# Patient Record
Sex: FEMALE | Race: OTHER | NOT HISPANIC OR LATINO
[De-identification: names, ages, dates, MRNs, and addresses within clinical notes are randomized per-mention and may not be internally consistent; named-entity substitution may affect disease eponyms.]

---

## 2018-03-19 PROBLEM — Z00.00 ENCOUNTER FOR PREVENTIVE HEALTH EXAMINATION: Status: ACTIVE | Noted: 2018-03-19

## 2018-04-10 ENCOUNTER — FORM ENCOUNTER (OUTPATIENT)
Age: 33
End: 2018-04-10

## 2018-04-14 ENCOUNTER — ASOB RESULT (OUTPATIENT)
Age: 33
End: 2018-04-14

## 2018-04-14 ENCOUNTER — APPOINTMENT (OUTPATIENT)
Dept: ANTEPARTUM | Facility: CLINIC | Age: 33
End: 2018-04-14
Payer: COMMERCIAL

## 2018-04-14 ENCOUNTER — APPOINTMENT (OUTPATIENT)
Dept: ANTEPARTUM | Facility: CLINIC | Age: 33
End: 2018-04-14

## 2018-04-14 PROCEDURE — 36416 COLLJ CAPILLARY BLOOD SPEC: CPT

## 2018-04-14 PROCEDURE — 76813 OB US NUCHAL MEAS 1 GEST: CPT

## 2018-04-19 LAB
1ST TRIMESTER DATA: NORMAL
ADDENDUM DOC: NORMAL
AFP PNL SERPL: NORMAL
AFP SERPL-ACNC: NORMAL
CLINICAL BIOCHEMIST REVIEW: NORMAL
FREE BETA HCG 1ST TRIMESTER: NORMAL
Lab: NORMAL
NASAL BONE: PRESENT
NOTES NTD: NORMAL
NT: NORMAL
PAPP-A SERPL-ACNC: NORMAL
TRISOMY 18/3: NORMAL

## 2018-06-05 ENCOUNTER — APPOINTMENT (OUTPATIENT)
Dept: ANTEPARTUM | Facility: CLINIC | Age: 33
End: 2018-06-05
Payer: COMMERCIAL

## 2018-06-05 ENCOUNTER — ASOB RESULT (OUTPATIENT)
Age: 33
End: 2018-06-05

## 2018-06-05 PROCEDURE — 76811 OB US DETAILED SNGL FETUS: CPT

## 2018-06-12 LAB
1ST TRIMESTER DATA: NORMAL
2ND TRIMESTER DATA: NORMAL
AFP PNL SERPL: NORMAL
AFP SERPL-ACNC: NORMAL
AFP SERPL-ACNC: NORMAL
B-HCG FREE SERPL-MCNC: NORMAL
CLINICAL BIOCHEMIST REVIEW: NORMAL
FREE BETA HCG 1ST TRIMESTER: NORMAL
INHIBIN A SERPL-MCNC: NORMAL
NASAL BONE: PRESENT
NOTES NTD: NORMAL
NT: NORMAL
PAPP-A SERPL-ACNC: NORMAL
U ESTRIOL SERPL-SCNC: NORMAL

## 2018-07-03 ENCOUNTER — ASOB RESULT (OUTPATIENT)
Age: 33
End: 2018-07-03

## 2018-07-03 ENCOUNTER — APPOINTMENT (OUTPATIENT)
Dept: ANTEPARTUM | Facility: CLINIC | Age: 33
End: 2018-07-03
Payer: COMMERCIAL

## 2018-07-03 PROCEDURE — 76817 TRANSVAGINAL US OBSTETRIC: CPT

## 2018-07-03 PROCEDURE — 76816 OB US FOLLOW-UP PER FETUS: CPT

## 2018-08-01 ENCOUNTER — APPOINTMENT (OUTPATIENT)
Dept: ANTEPARTUM | Facility: CLINIC | Age: 33
End: 2018-08-01
Payer: COMMERCIAL

## 2018-08-01 ENCOUNTER — ASOB RESULT (OUTPATIENT)
Age: 33
End: 2018-08-01

## 2018-08-01 PROCEDURE — 76816 OB US FOLLOW-UP PER FETUS: CPT

## 2018-10-10 ENCOUNTER — ASOB RESULT (OUTPATIENT)
Age: 33
End: 2018-10-10

## 2018-10-10 ENCOUNTER — APPOINTMENT (OUTPATIENT)
Dept: ANTEPARTUM | Facility: CLINIC | Age: 33
End: 2018-10-10
Payer: COMMERCIAL

## 2018-10-10 PROCEDURE — 76819 FETAL BIOPHYS PROFIL W/O NST: CPT

## 2018-10-10 PROCEDURE — 76816 OB US FOLLOW-UP PER FETUS: CPT

## 2020-11-24 ENCOUNTER — FORM ENCOUNTER (OUTPATIENT)
Age: 35
End: 2020-11-24

## 2021-01-03 ENCOUNTER — FORM ENCOUNTER (OUTPATIENT)
Age: 36
End: 2021-01-03

## 2021-01-06 ENCOUNTER — FORM ENCOUNTER (OUTPATIENT)
Age: 36
End: 2021-01-06

## 2021-06-21 ENCOUNTER — FORM ENCOUNTER (OUTPATIENT)
Age: 36
End: 2021-06-21

## 2021-06-28 ENCOUNTER — FORM ENCOUNTER (OUTPATIENT)
Age: 36
End: 2021-06-28

## 2021-07-14 ENCOUNTER — OUTPATIENT (OUTPATIENT)
Dept: OUTPATIENT SERVICES | Facility: HOSPITAL | Age: 36
LOS: 1 days | End: 2021-07-14
Payer: COMMERCIAL

## 2021-07-14 DIAGNOSIS — Z20.828 CONTACT WITH AND (SUSPECTED) EXPOSURE TO OTHER VIRAL COMMUNICABLE DISEASES: ICD-10-CM

## 2021-07-14 LAB — SARS-COV-2 RNA SPEC QL NAA+PROBE: SIGNIFICANT CHANGE UP

## 2021-07-14 PROCEDURE — 87635 SARS-COV-2 COVID-19 AMP PRB: CPT

## 2021-07-14 RX ORDER — SODIUM CHLORIDE 9 MG/ML
1000 INJECTION, SOLUTION INTRAVENOUS
Refills: 0 | Status: DISCONTINUED | OUTPATIENT
Start: 2021-07-15 | End: 2021-07-15

## 2021-07-14 RX ORDER — OXYTOCIN 10 UNIT/ML
333.33 VIAL (ML) INJECTION
Qty: 20 | Refills: 0 | Status: COMPLETED | OUTPATIENT
Start: 2021-07-15 | End: 2021-07-15

## 2021-07-14 NOTE — OB PROVIDER H&P - ATTENDING COMMENTS
Patient admitted for term induction of labor, AMA.  Patient was counseled regarding induction by her provider prior to admission.  FHRT reassuring  GBS positive   favorable cervix- plan for pitocin

## 2021-07-14 NOTE — OB PROVIDER H&P - ASSESSMENT
Patient is a 36 year old  at 39.5 weeks gestation admitted to L&D for elective IOL. Cat ___ FHT.    - consent  - admission labs  - IV hydration  - continuous toco and fetal heart monitoring  - GBS negative   - induction method:  - pain management:    Discussed with     Patient is a 36 year old  at 39.5 weeks gestation admitted to L&D for elective IOL. NST reactive.    - consent  - admission labs  - IV hydration  - continuous toco and fetal heart monitoring  - GBS negative     Discussed with Dr. Ortiz   Patient is a 36 year old  at 39.5 weeks gestation admitted to L&D for elective IOL. NST reactive.    - consent  - Childs score favorable for IOL  - IOL method: pitocin   - admission labs  - IV hydration  - continuous toco and fetal heart monitoring  - GBS negative     Discussed with Dr. Garcia

## 2021-07-14 NOTE — OB PROVIDER H&P - NSHPPHYSICALEXAM_GEN_ALL_CORE
Vital Signs Last 24 Hrs  T(C): 36.7 (15 Jul 2021 05:39), Max: 36.7 (15 Jul 2021 05:34)  T(F): 98.06 (15 Jul 2021 05:39), Max: 98.1 (15 Jul 2021 05:34)  HR: 88 (15 Jul 2021 05:39) (88 - 88)  BP: 118/57 (15 Jul 2021 05:39) (118/57 - 118/57)  RR: 16 (15 Jul 2021 05:39) (16 - 16)  General: Alert and oriented x3, no acute distress  Cardiovascular: regular rate and rhythm, no murmurs, rubs or gallops appreciated on exam  Respiratory: clear to auscultation bilaterally  Abdominal: gravid uterus, non-tender to palpation  Extremities: no redness, tenderness or swelling in lower extremities bilaterally     FHT: baseline 135bpm, moderate variability, +accels, -deccels  [place]: irregular contractions q5 minutes  Ultrasound: cephalic presentation, anterior placenta Vital Signs Last 24 Hrs  T(C): 36.7 (15 Jul 2021 05:39), Max: 36.7 (15 Jul 2021 05:34)  T(F): 98.06 (15 Jul 2021 05:39), Max: 98.1 (15 Jul 2021 05:34)  HR: 88 (15 Jul 2021 05:39) (88 - 88)  BP: 118/57 (15 Jul 2021 05:39) (118/57 - 118/57)  RR: 16 (15 Jul 2021 05:39) (16 - 16)  General: Alert and oriented x3, no acute distress  Cardiovascular: regular rate and rhythm, no murmurs, rubs or gallops appreciated on exam  Respiratory: clear to auscultation bilaterally  Abdominal: gravid uterus, non-tender to palpation  Extremities: no redness, tenderness or swelling in lower extremities bilaterally   SVE: 3/50/-2    FHT: baseline 135bpm, moderate variability, +accels, -deccels  [place]: irregular contractions q5 minutes  Ultrasound: cephalic presentation, anterior placenta

## 2021-07-15 ENCOUNTER — INPATIENT (INPATIENT)
Facility: HOSPITAL | Age: 36
LOS: 0 days | Discharge: HOME | End: 2021-07-16
Attending: OBSTETRICS & GYNECOLOGY | Admitting: OBSTETRICS & GYNECOLOGY
Payer: COMMERCIAL

## 2021-07-15 ENCOUNTER — TRANSCRIPTION ENCOUNTER (OUTPATIENT)
Age: 36
End: 2021-07-15

## 2021-07-15 VITALS
TEMPERATURE: 98 F | RESPIRATION RATE: 16 BRPM | DIASTOLIC BLOOD PRESSURE: 57 MMHG | WEIGHT: 199.96 LBS | HEIGHT: 63 IN | HEART RATE: 88 BPM | SYSTOLIC BLOOD PRESSURE: 118 MMHG

## 2021-07-15 LAB
ABO RH CONFIRMATION: SIGNIFICANT CHANGE UP
BASOPHILS # BLD AUTO: 0.03 K/UL — SIGNIFICANT CHANGE UP (ref 0–0.2)
BASOPHILS NFR BLD AUTO: 0.3 % — SIGNIFICANT CHANGE UP (ref 0–2)
BLD GP AB SCN SERPL QL: SIGNIFICANT CHANGE UP
COVID-19 SPIKE DOMAIN AB INTERP: NEGATIVE — SIGNIFICANT CHANGE UP
COVID-19 SPIKE DOMAIN ANTIBODY RESULT: 0.4 U/ML — SIGNIFICANT CHANGE UP
EOSINOPHIL # BLD AUTO: 0.05 K/UL — SIGNIFICANT CHANGE UP (ref 0–0.5)
EOSINOPHIL NFR BLD AUTO: 0.5 % — SIGNIFICANT CHANGE UP (ref 0–6)
HCT VFR BLD CALC: 36.1 % — SIGNIFICANT CHANGE UP (ref 34.5–45)
HGB BLD-MCNC: 12.4 G/DL — SIGNIFICANT CHANGE UP (ref 11.5–15.5)
HIV 1 & 2 AB SERPL IA.RAPID: SIGNIFICANT CHANGE UP
HIV 1+2 AB+HIV1 P24 AG SERPL QL IA: SIGNIFICANT CHANGE UP
IMM GRANULOCYTES NFR BLD AUTO: 0.6 % — SIGNIFICANT CHANGE UP (ref 0–1.5)
LYMPHOCYTES # BLD AUTO: 1.78 K/UL — SIGNIFICANT CHANGE UP (ref 1–3.3)
LYMPHOCYTES # BLD AUTO: 16.5 % — SIGNIFICANT CHANGE UP (ref 13–44)
MCHC RBC-ENTMCNC: 30.5 PG — SIGNIFICANT CHANGE UP (ref 27–34)
MCHC RBC-ENTMCNC: 34.3 GM/DL — SIGNIFICANT CHANGE UP (ref 32–36)
MCV RBC AUTO: 88.7 FL — SIGNIFICANT CHANGE UP (ref 80–100)
MEV IGG SER-ACNC: 38.2 AU/ML — SIGNIFICANT CHANGE UP
MEV IGG+IGM SER-IMP: POSITIVE — SIGNIFICANT CHANGE UP
MONOCYTES # BLD AUTO: 0.72 K/UL — SIGNIFICANT CHANGE UP (ref 0–0.9)
MONOCYTES NFR BLD AUTO: 6.7 % — SIGNIFICANT CHANGE UP (ref 2–14)
NEUTROPHILS # BLD AUTO: 8.18 K/UL — HIGH (ref 1.8–7.4)
NEUTROPHILS NFR BLD AUTO: 75.4 % — SIGNIFICANT CHANGE UP (ref 43–77)
PLATELET # BLD AUTO: 182 K/UL — SIGNIFICANT CHANGE UP (ref 150–400)
RBC # BLD: 4.07 M/UL — SIGNIFICANT CHANGE UP (ref 3.8–5.2)
RBC # FLD: 16.6 % — HIGH (ref 10.3–14.5)
SARS-COV-2 IGG+IGM SERPL QL IA: 0.4 U/ML — SIGNIFICANT CHANGE UP
SARS-COV-2 IGG+IGM SERPL QL IA: NEGATIVE — SIGNIFICANT CHANGE UP
T PALLIDUM AB TITR SER: NEGATIVE — SIGNIFICANT CHANGE UP
WBC # BLD: 10.82 K/UL — HIGH (ref 3.8–10.5)
WBC # FLD AUTO: 10.82 K/UL — HIGH (ref 3.8–10.5)

## 2021-07-15 RX ORDER — DIBUCAINE 1 %
1 OINTMENT (GRAM) RECTAL EVERY 6 HOURS
Refills: 0 | Status: DISCONTINUED | OUTPATIENT
Start: 2021-07-15 | End: 2021-07-16

## 2021-07-15 RX ORDER — LANOLIN
1 OINTMENT (GRAM) TOPICAL EVERY 6 HOURS
Refills: 0 | Status: DISCONTINUED | OUTPATIENT
Start: 2021-07-15 | End: 2021-07-16

## 2021-07-15 RX ORDER — DIPHENHYDRAMINE HCL 50 MG
25 CAPSULE ORAL EVERY 6 HOURS
Refills: 0 | Status: DISCONTINUED | OUTPATIENT
Start: 2021-07-15 | End: 2021-07-16

## 2021-07-15 RX ORDER — OXYCODONE HYDROCHLORIDE 5 MG/1
5 TABLET ORAL
Refills: 0 | Status: DISCONTINUED | OUTPATIENT
Start: 2021-07-15 | End: 2021-07-16

## 2021-07-15 RX ORDER — PRAMOXINE HYDROCHLORIDE 150 MG/15G
1 AEROSOL, FOAM RECTAL EVERY 4 HOURS
Refills: 0 | Status: DISCONTINUED | OUTPATIENT
Start: 2021-07-15 | End: 2021-07-16

## 2021-07-15 RX ORDER — SIMETHICONE 80 MG/1
80 TABLET, CHEWABLE ORAL EVERY 4 HOURS
Refills: 0 | Status: DISCONTINUED | OUTPATIENT
Start: 2021-07-15 | End: 2021-07-16

## 2021-07-15 RX ORDER — OXYTOCIN 10 UNIT/ML
333.33 VIAL (ML) INJECTION
Qty: 20 | Refills: 0 | Status: DISCONTINUED | OUTPATIENT
Start: 2021-07-15 | End: 2021-07-16

## 2021-07-15 RX ORDER — IBUPROFEN 200 MG
600 TABLET ORAL EVERY 6 HOURS
Refills: 0 | Status: COMPLETED | OUTPATIENT
Start: 2021-07-15 | End: 2022-06-13

## 2021-07-15 RX ORDER — CITRIC ACID/SODIUM CITRATE 300-500 MG
30 SOLUTION, ORAL ORAL ONCE
Refills: 0 | Status: COMPLETED | OUTPATIENT
Start: 2021-07-15 | End: 2021-07-15

## 2021-07-15 RX ORDER — BENZOCAINE 10 %
1 GEL (GRAM) MUCOUS MEMBRANE EVERY 6 HOURS
Refills: 0 | Status: DISCONTINUED | OUTPATIENT
Start: 2021-07-15 | End: 2021-07-16

## 2021-07-15 RX ORDER — MAGNESIUM HYDROXIDE 400 MG/1
30 TABLET, CHEWABLE ORAL
Refills: 0 | Status: DISCONTINUED | OUTPATIENT
Start: 2021-07-15 | End: 2021-07-16

## 2021-07-15 RX ORDER — HYDROCORTISONE 1 %
1 OINTMENT (GRAM) TOPICAL EVERY 6 HOURS
Refills: 0 | Status: DISCONTINUED | OUTPATIENT
Start: 2021-07-15 | End: 2021-07-16

## 2021-07-15 RX ORDER — IBUPROFEN 200 MG
600 TABLET ORAL EVERY 6 HOURS
Refills: 0 | Status: DISCONTINUED | OUTPATIENT
Start: 2021-07-15 | End: 2021-07-16

## 2021-07-15 RX ORDER — OXYTOCIN 10 UNIT/ML
VIAL (ML) INJECTION
Qty: 30 | Refills: 0 | Status: DISCONTINUED | OUTPATIENT
Start: 2021-07-15 | End: 2021-07-15

## 2021-07-15 RX ORDER — SODIUM CHLORIDE 9 MG/ML
3 INJECTION INTRAMUSCULAR; INTRAVENOUS; SUBCUTANEOUS EVERY 8 HOURS
Refills: 0 | Status: DISCONTINUED | OUTPATIENT
Start: 2021-07-15 | End: 2021-07-16

## 2021-07-15 RX ORDER — OXYCODONE HYDROCHLORIDE 5 MG/1
5 TABLET ORAL ONCE
Refills: 0 | Status: DISCONTINUED | OUTPATIENT
Start: 2021-07-15 | End: 2021-07-16

## 2021-07-15 RX ORDER — TETANUS TOXOID, REDUCED DIPHTHERIA TOXOID AND ACELLULAR PERTUSSIS VACCINE, ADSORBED 5; 2.5; 8; 8; 2.5 [IU]/.5ML; [IU]/.5ML; UG/.5ML; UG/.5ML; UG/.5ML
0.5 SUSPENSION INTRAMUSCULAR ONCE
Refills: 0 | Status: DISCONTINUED | OUTPATIENT
Start: 2021-07-15 | End: 2021-07-16

## 2021-07-15 RX ORDER — KETOROLAC TROMETHAMINE 30 MG/ML
30 SYRINGE (ML) INJECTION ONCE
Refills: 0 | Status: DISCONTINUED | OUTPATIENT
Start: 2021-07-15 | End: 2021-07-15

## 2021-07-15 RX ORDER — AER TRAVELER 0.5 G/1
1 SOLUTION RECTAL; TOPICAL EVERY 4 HOURS
Refills: 0 | Status: DISCONTINUED | OUTPATIENT
Start: 2021-07-15 | End: 2021-07-16

## 2021-07-15 RX ORDER — SODIUM CHLORIDE 9 MG/ML
1000 INJECTION, SOLUTION INTRAVENOUS ONCE
Refills: 0 | Status: COMPLETED | OUTPATIENT
Start: 2021-07-15 | End: 2021-07-15

## 2021-07-15 RX ORDER — ACETAMINOPHEN 500 MG
975 TABLET ORAL
Refills: 0 | Status: DISCONTINUED | OUTPATIENT
Start: 2021-07-15 | End: 2021-07-16

## 2021-07-15 RX ADMIN — Medication 975 MILLIGRAM(S): at 21:41

## 2021-07-15 RX ADMIN — Medication 1000 MILLIUNIT(S)/MIN: at 14:29

## 2021-07-15 RX ADMIN — Medication 30 MILLIGRAM(S): at 16:08

## 2021-07-15 RX ADMIN — SODIUM CHLORIDE 1000 MILLILITER(S): 9 INJECTION, SOLUTION INTRAVENOUS at 09:45

## 2021-07-15 RX ADMIN — Medication 30 MILLIGRAM(S): at 16:23

## 2021-07-15 RX ADMIN — SODIUM CHLORIDE 125 MILLILITER(S): 9 INJECTION, SOLUTION INTRAVENOUS at 05:54

## 2021-07-15 RX ADMIN — SODIUM CHLORIDE 3 MILLILITER(S): 9 INJECTION INTRAMUSCULAR; INTRAVENOUS; SUBCUTANEOUS at 21:41

## 2021-07-15 RX ADMIN — Medication 2 MILLIUNIT(S)/MIN: at 08:20

## 2021-07-15 RX ADMIN — Medication 30 MILLILITER(S): at 10:10

## 2021-07-15 RX ADMIN — Medication 975 MILLIGRAM(S): at 20:56

## 2021-07-15 NOTE — OB RN PATIENT PROFILE - AS SC BRADEN MOBILITY
Declan Mendez), Internal Medicine  [address] [place] [address]  Phone: [phone]  Fax: [phone]    Laurie Fuentes  [address] [place] [address]  Phone: [phone]  Fax: (   )    -    Abdifatah Brown  [facility]  Oncology building  Level C  Phone: [phone]  Fax: [phone] (4) no limitation

## 2021-07-15 NOTE — DISCHARGE NOTE OB - CARE PROVIDER_API CALL
Matt Garcia)  Obstetrics and Gynecology  [address], [address]  [place] [address]  Phone: [phone]  Fax: [phone]  Follow Up Time: 1 month

## 2021-07-15 NOTE — OB PROVIDER DELIVERY SUMMARY - NSPROVIDERDELIVERYNOTE_OBGYN_ALL_OB_FT
at 1423 of a live female w/ Apgars 9/9. Weighing deferred for skin-to-skin. Delivered NASIMA, no nuchal cord, clear fluid. Midline episiotomy incised. Infant's head delivered with maternal expulsive efforts. Shoulders delivered without difficulty followed by the rest of the body. Nose and mouth were bulb suctioned. Cord clamped and cut after delay. Samples obtained. Baby handed to patient. Placenta delivered spontaneously, intact, 3VC. Fundus firm, minimal bleeding. Perineum and vagina inspected – midline episiotomy repaired. EBL 250cc. Hemostasis noted. Pt tolerated procedure well, in stable condition, recovering in LDR. Infant in LDR. Instrument/sponge count correct x 2 and confirmed by nurse.

## 2021-07-15 NOTE — DISCHARGE NOTE OB - NSCORESITESY/N_GEN_A_CORE_RD
Calling to find out her current plan of care. Mireya Nurse  at [facility] [phone]. Thank you. Aracelis    Returned call.   Left message
No

## 2021-07-15 NOTE — DISCHARGE NOTE OB - PATIENT PORTAL LINK FT
You can access the FollowMyHealth Patient Portal offered by [facility] by registering at the following website: [email or web address]. By joining [facility]’s FollowMyHealth portal, you will also be able to view your health information using other applications (apps) compatible with our system.

## 2021-07-15 NOTE — DISCHARGE NOTE OB - CARE PLAN
Principal Discharge DX:	 (normal spontaneous vaginal delivery)  Goal:	rapid recovery  Assessment and plan of treatment:	1) Please take Ibuprofen as needed for pain control  2) Nothing in the vagina for 6 weeks (including no sex, no tampons, and no douching).  3) Please call your doctor for a follow up your postpartum appointment in 4-6 weeks.  4) Please continue taking vitamins postpartum. Take iron and colace for acute blood loss anemia.  5) Please call the office sooner if you have heavy vaginal bleeding, severe abdominal pain, or fever > 100.4F.  6) You may resume regular activity as tolerated

## 2021-07-15 NOTE — DISCHARGE NOTE OB - PLAN OF CARE
rapid recovery 1) Please take Ibuprofen as needed for pain control  2) Nothing in the vagina for 6 weeks (including no sex, no tampons, and no douching).  3) Please call your doctor for a follow up your postpartum appointment in 4-6 weeks.  4) Please continue taking vitamins postpartum. Take iron and colace for acute blood loss anemia.  5) Please call the office sooner if you have heavy vaginal bleeding, severe abdominal pain, or fever > 100.4F.  6) You may resume regular activity as tolerated

## 2021-07-15 NOTE — OB RN DELIVERY SUMMARY - NSSELHIDDEN_OBGYN_ALL_OB_FT
[NS_DeliveryAttending1_OBGYN_ALL_OB_FT:[id number]],[NS_DeliveryRN_OBGYN_ALL_OB_FT:[id number]=]

## 2021-07-15 NOTE — DISCHARGE NOTE OB - MEDICATION SUMMARY - MEDICATIONS TO TAKE
I will START or STAY ON the medications listed below when I get home from the hospital:    acetaminophen 500 mg oral tablet  -- 2 tab(s) by mouth every 6 hours MDD:4 tablets  -- This product contains acetaminophen.  Do not use  with any other product containing acetaminophen to prevent possible liver damage.    -- Indication: For pain    ibuprofen 600 mg oral tablet  -- 1 tab(s) by mouth every 6 hours MDD:4 tablets  -- Do not take this drug if you are pregnant.  It is very important that you take or use this exactly as directed.  Do not skip doses or discontinue unless directed by your doctor.  May cause drowsiness or dizziness.  Obtain medical advice before taking any non-prescription drugs as some may affect the action of this medication.  Take with food or milk.    -- Indication: For pain    Prenatal Multivitamins with Folic Acid 1 mg oral tablet  -- 1 tab(s) by mouth once a day  -- Indication: For maternal wellness

## 2021-07-15 NOTE — OB RN DELIVERY SUMMARY - NS_SEPSISRSKCALC_OBGYN_ALL_OB_FT
EOS calculated successfully. EOS Risk Factor: 0.5/1000 live births ([facility] national incidence); GA=39w5d; Temp=98.1; ROM=3.55; GBS='Negative'; Antibiotics='No antibiotics or any antibiotics < 2 hrs prior to birth'

## 2021-07-15 NOTE — OB PROVIDER DELIVERY SUMMARY - NSSELHIDDEN_OBGYN_ALL_OB_FT
[NS_DeliveryAttending1_OBGYN_ALL_OB_FT:[id number]],[NS_DeliveryRN_OBGYN_ALL_OB_FT:[id number]],[NS_DeliveryAssist1_OBGYN_ALL_OB_FT:[id number]=]

## 2021-07-16 VITALS — SYSTOLIC BLOOD PRESSURE: 112 MMHG | HEART RATE: 85 BPM | DIASTOLIC BLOOD PRESSURE: 73 MMHG | RESPIRATION RATE: 18 BRPM

## 2021-07-16 LAB
HCT VFR BLD CALC: 34.7 % — SIGNIFICANT CHANGE UP (ref 34.5–45)
HGB BLD-MCNC: 11.3 G/DL — LOW (ref 11.5–15.5)

## 2021-07-16 PROCEDURE — G0463: CPT

## 2021-07-16 PROCEDURE — 86765 RUBEOLA ANTIBODY: CPT

## 2021-07-16 PROCEDURE — 86769 SARS-COV-2 COVID-19 ANTIBODY: CPT

## 2021-07-16 PROCEDURE — 86703 HIV-1/HIV-2 1 RESULT ANTBDY: CPT

## 2021-07-16 PROCEDURE — 85018 HEMOGLOBIN: CPT

## 2021-07-16 PROCEDURE — 85014 HEMATOCRIT: CPT

## 2021-07-16 PROCEDURE — 59025 FETAL NON-STRESS TEST: CPT

## 2021-07-16 PROCEDURE — 86780 TREPONEMA PALLIDUM: CPT

## 2021-07-16 PROCEDURE — 87389 HIV-1 AG W/HIV-1&-2 AB AG IA: CPT

## 2021-07-16 PROCEDURE — 86901 BLOOD TYPING SEROLOGIC RH(D): CPT

## 2021-07-16 PROCEDURE — 85025 COMPLETE CBC W/AUTO DIFF WBC: CPT

## 2021-07-16 PROCEDURE — 86850 RBC ANTIBODY SCREEN: CPT

## 2021-07-16 PROCEDURE — 86900 BLOOD TYPING SEROLOGIC ABO: CPT

## 2021-07-16 PROCEDURE — 36415 COLL VENOUS BLD VENIPUNCTURE: CPT

## 2021-07-16 PROCEDURE — 59050 FETAL MONITOR W/REPORT: CPT

## 2021-07-16 RX ORDER — ACETAMINOPHEN 500 MG
2 TABLET ORAL
Qty: 32 | Refills: 0
Start: 2021-07-16 | End: 2021-07-19

## 2021-07-16 RX ORDER — IBUPROFEN 200 MG
1 TABLET ORAL
Qty: 16 | Refills: 0
Start: 2021-07-16 | End: 2021-07-19

## 2021-07-16 RX ADMIN — Medication 975 MILLIGRAM(S): at 15:35

## 2021-07-16 RX ADMIN — Medication 600 MILLIGRAM(S): at 05:21

## 2021-07-16 RX ADMIN — Medication 975 MILLIGRAM(S): at 03:11

## 2021-07-16 RX ADMIN — Medication 600 MILLIGRAM(S): at 00:31

## 2021-07-16 RX ADMIN — Medication 975 MILLIGRAM(S): at 16:35

## 2021-07-16 RX ADMIN — Medication 600 MILLIGRAM(S): at 06:07

## 2021-07-16 RX ADMIN — Medication 600 MILLIGRAM(S): at 13:27

## 2021-07-16 RX ADMIN — Medication 600 MILLIGRAM(S): at 01:16

## 2021-07-16 RX ADMIN — Medication 975 MILLIGRAM(S): at 09:35

## 2021-07-16 RX ADMIN — Medication 975 MILLIGRAM(S): at 10:35

## 2021-07-16 RX ADMIN — Medication 1 TABLET(S): at 12:28

## 2021-07-16 RX ADMIN — Medication 600 MILLIGRAM(S): at 12:27

## 2021-07-16 RX ADMIN — Medication 975 MILLIGRAM(S): at 02:26

## 2021-07-16 NOTE — PROGRESS NOTE ADULT - SUBJECTIVE AND OBJECTIVE BOX
CRESENCIO BOGGS is a 36y  now PPD#1 s/p spontaneous vaginal delivery at 39.5 weeks gestation, uncomplicated.    S:    No acute events overnight.   The patient has no complaints.  Pain controlled with current treatment regimen.   She is ambulating without difficulty and tolerating PO.   + flatus/-BM/+ voiding   She endorses appropriate lochia, which is decreasing.   She is breastfeeding without difficulty. She denies feeling engorged.   She denies fevers, chills, nausea and vomiting.   She denies lightheadedness, dizziness, palpitations, chest pain and SOB.   She is on contact precautions because  tested positive for COVID. She tested negative and is asymptomatic.     O:    T(C): 36.4 (21 @ 05:31), Max: 36.9 (07-15-21 @ 17:00)  HR: 98 (21 @ 05:31) (77 - 106)  BP: 102/67 (21 @ 05:31) (78/56 - 120/71)  RR: 18 (21 @ 05:31) (16 - 18)  SpO2: 98% (21 @ 05:31) (97% - 100%)    Gen: NAD, AOx3  CV: RRR, S1/S2 present  Pulm: CTAB  Abdomen:  Soft, non-tender, non-distended, +bowel sounds  Uterus:  Fundus firm below umbilicus  VE:  Expectant lochia  Ext:  Bilateral lower extremities non-tender and non-edematous                          12.4   10.82 )-----------( 182      ( 15 Jul 2021 06:23 )             36.1

## 2021-07-16 NOTE — PROGRESS NOTE ADULT - ASSESSMENT
CRESENCIO BOGGS is a 36y  now PPD#1 s/p spontaneous vaginal delivery at 39.5 weeks gestation, uncomplicated.      A/P:    -Vital signs stable  -Hgb: 12.4 -> AM labs pending   -Voiding, tolerating PO, bowel function nml   -Advance care as tolerated   -Continue routine postpartum care and education  -Healthy female infant  -Dispo: Anticipate discharge to home today pending attending approval.

## 2021-10-19 PROBLEM — Z78.9 OTHER SPECIFIED HEALTH STATUS: Chronic | Status: ACTIVE | Noted: 2021-07-14

## 2021-11-02 ENCOUNTER — NON-APPOINTMENT (OUTPATIENT)
Age: 36
End: 2021-11-02

## 2021-11-02 DIAGNOSIS — N20.2 CALCULUS OF KIDNEY WITH CALCULUS OF URETER: ICD-10-CM

## 2021-11-02 DIAGNOSIS — Z87.440 PERSONAL HISTORY OF URINARY (TRACT) INFECTIONS: ICD-10-CM

## 2021-11-02 RX ORDER — PRENATAL VIT 49/IRON FUM/FOLIC 6.75-0.2MG
TABLET ORAL
Refills: 0 | Status: ACTIVE | COMMUNITY

## 2021-11-17 ENCOUNTER — APPOINTMENT (OUTPATIENT)
Dept: OBGYN | Facility: CLINIC | Age: 36
End: 2021-11-17

## 2021-12-17 ENCOUNTER — FORM ENCOUNTER (OUTPATIENT)
Age: 36
End: 2021-12-17

## 2021-12-18 ENCOUNTER — TRANSCRIPTION ENCOUNTER (OUTPATIENT)
Age: 36
End: 2021-12-18

## 2022-07-29 NOTE — OB PROVIDER H&P - PRO BLOOD TYPE INFANT
Please follow up with your primary care provider in 2-3 days. You can take tylenol/NSAIDS for pain as directed on the bottle. You can apply ice for 15 minutes on and 15 minutes off. If your symptoms worsen or you develop chest pain, palpitations, shortness of breath, difficultly breathing please return to the emergency department. If you continue to have symptoms or leg swelling please have a repeat outpatient ultrasound.     
B positive

## 2022-09-28 NOTE — OB RN DELIVERY SUMMARY - NS_GBSABX_OBGYN_ALL_OB
First this is a Dr davis patient, but no one put it in when he established. But he starting a nee job and he was taking a physical with [facility] and because he is dm he needs a release from pcp to start this new job. We are to fax this to where he took his physical, he just needs to be medically cleared.    Fax to Darío at [phone] (fax)  Phone is [phone]    And if there are any questions please call the patient. He is aware that you are out of the office today.    Thank you   N/A

## 2024-09-23 NOTE — OB PROVIDER H&P - ATTENDING SUPERVISION STATEMENT
September 23, 2024    Rhona Martinez  [address] [place] [address]      Dear parent of Rhona,                   Our records indicates she is due for a well check and vaccines.  Please call the office at [phone] to schedule an appointment or to let us know if she has a new doctor. Por favor llame la oficina para hacer len  If you have any questions or concerns, please don't hesitate to call.    Sincerely,             [facility]        CC: No Recipients  
Resident